# Patient Record
Sex: MALE | URBAN - METROPOLITAN AREA
[De-identification: names, ages, dates, MRNs, and addresses within clinical notes are randomized per-mention and may not be internally consistent; named-entity substitution may affect disease eponyms.]

---

## 2024-02-14 LAB
ESTIMATED AVERAGE GLUCOSE: 114
HBA1C MFR BLD: 5.6 %

## 2024-02-23 ENCOUNTER — OFFICE VISIT (OUTPATIENT)
Age: 49
End: 2024-02-23

## 2024-02-23 VITALS
HEIGHT: 74 IN | OXYGEN SATURATION: 94 % | TEMPERATURE: 98.2 F | WEIGHT: 265 LBS | BODY MASS INDEX: 34.01 KG/M2 | RESPIRATION RATE: 17 BRPM | SYSTOLIC BLOOD PRESSURE: 135 MMHG | DIASTOLIC BLOOD PRESSURE: 92 MMHG | HEART RATE: 70 BPM

## 2024-02-23 DIAGNOSIS — R03.0 ELEVATED BP WITHOUT DIAGNOSIS OF HYPERTENSION: ICD-10-CM

## 2024-02-23 DIAGNOSIS — T14.8XXA INFECTED BLISTER: Primary | ICD-10-CM

## 2024-02-23 DIAGNOSIS — L08.9 INFECTED BLISTER: Primary | ICD-10-CM

## 2024-02-23 PROBLEM — E66.9 OBESITY: Status: ACTIVE | Noted: 2024-02-23

## 2024-02-23 PROBLEM — E78.5 HYPERLIPIDEMIA: Status: ACTIVE | Noted: 2024-02-23

## 2024-02-23 PROBLEM — E78.6 LOW HDL (UNDER 40): Status: ACTIVE | Noted: 2022-03-07

## 2024-02-23 PROBLEM — E55.9 VITAMIN D DEFICIENCY: Status: ACTIVE | Noted: 2024-02-23

## 2024-02-23 PROBLEM — R73.03 PREDIABETES: Status: ACTIVE | Noted: 2022-03-07

## 2024-02-23 RX ORDER — TRIAMCINOLONE ACETONIDE 0.25 MG/G
OINTMENT TOPICAL
Qty: 15 G | Refills: 0 | Status: SHIPPED | OUTPATIENT
Start: 2024-02-23 | End: 2024-03-01

## 2024-02-23 RX ORDER — KETOCONAZOLE 20 MG/G
CREAM TOPICAL DAILY
COMMUNITY

## 2024-02-23 RX ORDER — CEPHALEXIN 500 MG/1
500 CAPSULE ORAL 3 TIMES DAILY
Qty: 21 CAPSULE | Refills: 0 | Status: SHIPPED | OUTPATIENT
Start: 2024-02-23 | End: 2024-03-01

## 2024-02-23 NOTE — PROGRESS NOTES
Jayjay Trimble Jr. (:  1975) is a 48 y.o. male,New patient, here for evaluation of the following chief complaint(s):  infection on finger (Started this week, small blisters on both hands, looks like pimples. Left hand is healing fine. Right hand on ring finger is slightly swollen, warm to the touch, painful only when making a fist. )      ASSESSMENT/PLAN:    ICD-10-CM    1. Infected blister  T14.8XXA     L08.9       2. Elevated BP without diagnosis of hypertension  R03.0           Elevated blood pressure today. Continue to monitor at home and contact PCP if it is consistently elevated.    Monitor for worsening signs of infection like fever, swelling or increased pain  Follow up with your pcp in 7 days if symptoms persist or if symptoms worsen.    SUBJECTIVE/OBJECTIVE:    History provided by:  Patient   used: No      HPI:   48 y.o. male presents with symptoms of blisters on both hands the left hand is healing per patient, right hand is slightly swelling and the right ring finger is swelling with heat to the touch  ongoing since 1 week ago. Denies fever.  Discomfort when making a fist on the right hand.  Vitals:    24 1450 24 1453   BP: (!) 149/103 (!) 135/92   Site: Left Upper Arm Left Upper Arm   Position: Sitting Sitting   Cuff Size: Large Adult Large Adult   Pulse: 70    Resp: 17    Temp: 98.2 °F (36.8 °C)    TempSrc: Oral    SpO2: 94%    Weight: 120.2 kg (265 lb)    Height: 1.88 m (6' 2\")        Review of Systems    Physical Exam  Vitals and nursing note reviewed.   Constitutional:       Appearance: Normal appearance.   HENT:      Head: Normocephalic.      Right Ear: Tympanic membrane normal.   Skin:     General: Skin is warm and dry.      Findings: Erythema and rash present. Rash is macular.      Comments: Right 4th finger at the PIP joint erythema with a papular abscess  with appearance of pimple that now has swelling at the site, heat to touch, and  pressure from the

## 2024-02-23 NOTE — PATIENT INSTRUCTIONS
Elevated blood pressure today. Continue to monitor at home and contact PCP if it is consistently elevated.    Monitor for worsening signs of infection like fever, swelling or increased pain  Follow up with your pcp in 7 days if symptoms persist or if symptoms worsen.  New Prescriptions    CEPHALEXIN (KEFLEX) 500 MG CAPSULE    Take 1 capsule by mouth 3 times daily for 7 days    TRIAMCINOLONE (KENALOG) 0.025 % OINTMENT    Apply topically 2 times daily.

## 2024-03-12 ENCOUNTER — OFFICE VISIT (OUTPATIENT)
Age: 49
End: 2024-03-12

## 2024-03-12 VITALS
SYSTOLIC BLOOD PRESSURE: 136 MMHG | OXYGEN SATURATION: 96 % | DIASTOLIC BLOOD PRESSURE: 96 MMHG | TEMPERATURE: 97.6 F | HEART RATE: 72 BPM

## 2024-03-12 DIAGNOSIS — T16.1XXA FOREIGN BODY OF RIGHT EAR, INITIAL ENCOUNTER: Primary | ICD-10-CM

## 2024-03-12 NOTE — PATIENT INSTRUCTIONS
Monitor for any ear pain or complications, return if this occurs.  No more ear plugs I would wear over the ear hearing protection   Follow up with your pcp in 7 days if symptoms persist or if symptoms worsen.

## 2024-03-12 NOTE — PROGRESS NOTES
Jayjay Trimble Jr. (:  1975) is a 48 y.o. male,Established patient, here for evaluation of the following chief complaint(s):  Foreign Body in Ear (Right ear , Pice of ear plug foam, just happened today)      ASSESSMENT/PLAN:    ICD-10-CM    1. Foreign body of right ear, initial encounter  T16.1XXA 89931 - CT REMV EXT CANAL FOREIGN BODY          Monitor for any ear pain or complications, return if this occurs.  No more ear plugs I would wear over the ear hearing protection   Follow up with your pcp in 7 days if symptoms persist or if symptoms worsen.    SUBJECTIVE/OBJECTIVE:    History provided by:  Patient   used: No    Foreign Body in Ear      HPI:   48 y.o. male presents with symptoms of ear plug stuck in the right ongoing since this AM. Denies injury to the era. Has taken nothing for symptoms.    Vitals:    24 1834   BP: (!) 136/96   Pulse: 72   Temp: 97.6 °F (36.4 °C)   TempSrc: Temporal   SpO2: 96%       Review of Systems    Physical Exam  Vitals and nursing note reviewed.   Constitutional:       Appearance: Normal appearance.   HENT:      Head: Normocephalic.      Right Ear: Hearing and external ear normal. A foreign body is present.      Left Ear: Hearing, tympanic membrane, ear canal and external ear normal.      Ears:      Comments: [employer] ear plug lodged in the right ear, removed with forceps and assessment of the ear canal showed very little irritation and or erythema.  No hearing changes upon assessment post removal.          Nose: Nose normal.      Mouth/Throat:      Mouth: Mucous membranes are moist.   Neurological:      Mental Status: He is alert.           An electronic signature was used to authenticate this note.    --Andre Mary, APRN - CNP

## 2024-10-01 ENCOUNTER — OFFICE VISIT (OUTPATIENT)
Dept: FAMILY MEDICINE CLINIC | Age: 49
End: 2024-10-01
Payer: COMMERCIAL

## 2024-10-01 VITALS
HEIGHT: 74 IN | HEART RATE: 86 BPM | WEIGHT: 258.2 LBS | BODY MASS INDEX: 33.14 KG/M2 | OXYGEN SATURATION: 97 % | DIASTOLIC BLOOD PRESSURE: 80 MMHG | SYSTOLIC BLOOD PRESSURE: 124 MMHG

## 2024-10-01 DIAGNOSIS — E55.9 VITAMIN D DEFICIENCY: Primary | ICD-10-CM

## 2024-10-01 DIAGNOSIS — R03.0 ELEVATED BLOOD PRESSURE READING: ICD-10-CM

## 2024-10-01 DIAGNOSIS — L40.8 INVERSE PSORIASIS: ICD-10-CM

## 2024-10-01 PROCEDURE — 99203 OFFICE O/P NEW LOW 30 MIN: CPT | Performed by: STUDENT IN AN ORGANIZED HEALTH CARE EDUCATION/TRAINING PROGRAM

## 2024-10-01 RX ORDER — ROFLUMILAST 3 MG/G
1 CREAM TOPICAL DAILY PRN
COMMUNITY

## 2024-10-01 RX ORDER — ERGOCALCIFEROL 1.25 MG/1
50000 CAPSULE, LIQUID FILLED ORAL WEEKLY
COMMUNITY

## 2024-10-01 SDOH — ECONOMIC STABILITY: FOOD INSECURITY: WITHIN THE PAST 12 MONTHS, THE FOOD YOU BOUGHT JUST DIDN'T LAST AND YOU DIDN'T HAVE MONEY TO GET MORE.: NEVER TRUE

## 2024-10-01 SDOH — ECONOMIC STABILITY: FOOD INSECURITY: WITHIN THE PAST 12 MONTHS, YOU WORRIED THAT YOUR FOOD WOULD RUN OUT BEFORE YOU GOT MONEY TO BUY MORE.: NEVER TRUE

## 2024-10-01 SDOH — ECONOMIC STABILITY: INCOME INSECURITY: HOW HARD IS IT FOR YOU TO PAY FOR THE VERY BASICS LIKE FOOD, HOUSING, MEDICAL CARE, AND HEATING?: NOT HARD AT ALL

## 2024-10-01 ASSESSMENT — PATIENT HEALTH QUESTIONNAIRE - PHQ9
2. FEELING DOWN, DEPRESSED OR HOPELESS: NOT AT ALL
SUM OF ALL RESPONSES TO PHQ9 QUESTIONS 1 & 2: 0
SUM OF ALL RESPONSES TO PHQ QUESTIONS 1-9: 0
1. LITTLE INTEREST OR PLEASURE IN DOING THINGS: NOT AT ALL
SUM OF ALL RESPONSES TO PHQ QUESTIONS 1-9: 0

## 2024-10-01 NOTE — PROGRESS NOTES
[facility]  Establish care visit   10/1/2024    Jayjay Trimble Jr. (:  1975) is a 49 y.o. male, here to establish care.    Chief Complaint   Patient presents with    [facility] Care    Hypertension        ASSESSMENT/ PLAN  1. Vitamin D deficiency  Chronic.  Stable.  On supplementation.  Does not need a refill.  Takes over-the-counter.  Not yet due for lab work.    2. Inverse psoriasis  Chronic.  Stable.  On Roflumilast as needed.  Follows with a dermatologist.    3. Elevated blood pressure reading  Uses a blood pressure cuff at home that is a wrist cuff.  Discussed the accuracy of this cuff.  Advised an upper arm cuff.  Remeasured upper arm blood pressure today with a different cuff, and was still normal range.  Gave reassurance.        No follow-ups on file.    HPI  Patient is a 49-year-old male, who presents to clinic to establish care.  Patient is originally from [facility].  He currently lives in [facility].  He lives with his wife.  He does have 1 daughter of his own, who is no ground, he does not have any grandchildren.  He works in a factory making breaks.  His last PCP was Dr. Yepez.  Patient likes Dr. Strickland, but states that he was always running behind by approximately 1 hour and would frequently revisit the COVID-vaccine despite multiple refusals from the patient.    Patient states it has been approximately 9 months since his last well visit.  He does have an upcoming bit of well lab work through [employer] that he will fax the information to us.    Patient has a history of vitamin D deficiency.  He states that he is not on 50,000 units weekly, but rather is on once daily lower dose vitamin D supplementation.  He is not yet due for repeat lab work at this time.    Patient also has a history of what he reports as inverse psoriasis.  Patient is on Roflumilast as needed.  He does take it once daily during a flareup and it seems to work well for him.  He does

## 2024-12-04 ENCOUNTER — COMMUNITY OUTREACH (OUTPATIENT)
Dept: FAMILY MEDICINE CLINIC | Age: 49
End: 2024-12-04

## 2024-12-04 NOTE — PROGRESS NOTES
Patient's  shows they are overdue for Hemoglobin A1C  Care Everywhere and  files searched.   updated with 2/14/24 A1C result.